# Patient Record
Sex: MALE | Race: WHITE | NOT HISPANIC OR LATINO | Employment: OTHER | URBAN - METROPOLITAN AREA
[De-identification: names, ages, dates, MRNs, and addresses within clinical notes are randomized per-mention and may not be internally consistent; named-entity substitution may affect disease eponyms.]

---

## 2021-09-06 ENCOUNTER — TELEPHONE (OUTPATIENT)
Dept: OTHER | Facility: OTHER | Age: 81
End: 2021-09-06

## 2021-09-06 NOTE — TELEPHONE ENCOUNTER
Patient's daughter is having an issue scheduling a shuntogram  She would like to touch base with the office to discuss this issue, as this is a requirement for the patient's appointment on 9/14  Please give her a call

## 2021-09-07 ENCOUNTER — TELEPHONE (OUTPATIENT)
Dept: NEUROSURGERY | Facility: CLINIC | Age: 81
End: 2021-09-07

## 2021-09-07 NOTE — TELEPHONE ENCOUNTER
Received a call from Tracey King with questions about shuntogram  She states that she is seeking a second opinion from [address] and wonders if a shuntogram can be ordered  She states Maxwell's current nsx will not order this imaging without another appt, but she does not see a valid reason since he was seen so recently by them  Explained that without seeing him we have no way of ordering imaging and justifying it to insurance  She stated an understanding, explained though this imaging would be helpful is not needed by our office to establish and if this study is needed after seeing Dr Al Boswell can have this ordered at that time but that is the decision of the neurosurgeon  She was appreciative

## 2021-09-14 ENCOUNTER — OFFICE VISIT (OUTPATIENT)
Dept: NEUROSURGERY | Facility: CLINIC | Age: 81
End: 2021-09-14
Payer: MEDICARE

## 2021-09-14 ENCOUNTER — HOSPITAL ENCOUNTER (OUTPATIENT)
Dept: RADIOLOGY | Facility: HOSPITAL | Age: 81
Discharge: HOME | End: 2021-09-14
Attending: NEUROLOGICAL SURGERY
Payer: MEDICARE

## 2021-09-14 ENCOUNTER — TRANSCRIBE ORDERS (OUTPATIENT)
Dept: NEUROSURGERY | Facility: CLINIC | Age: 81
End: 2021-09-14

## 2021-09-14 VITALS
RESPIRATION RATE: 18 BRPM | HEIGHT: 72 IN | DIASTOLIC BLOOD PRESSURE: 56 MMHG | TEMPERATURE: 97.4 F | HEART RATE: 64 BPM | SYSTOLIC BLOOD PRESSURE: 114 MMHG

## 2021-09-14 DIAGNOSIS — Z98.2 S/P VP SHUNT: ICD-10-CM

## 2021-09-14 DIAGNOSIS — G91.9 HYDROCEPHALUS, UNSPECIFIED TYPE (HCC): Primary | ICD-10-CM

## 2021-09-14 DIAGNOSIS — G91.9 HYDROCEPHALUS (HCC): ICD-10-CM

## 2021-09-14 PROCEDURE — 99204 OFFICE O/P NEW MOD 45 MIN: CPT | Performed by: NEUROLOGICAL SURGERY

## 2021-09-14 PROCEDURE — 70250 X-RAY EXAM OF SKULL: CPT

## 2021-09-14 PROCEDURE — 74018 RADEX ABDOMEN 1 VIEW: CPT

## 2021-09-14 PROCEDURE — 71046 X-RAY EXAM CHEST 2 VIEWS: CPT

## 2021-09-14 RX ORDER — TAMSULOSIN HYDROCHLORIDE 0.4 MG/1
0.4 CAPSULE ORAL DAILY
COMMUNITY
Start: 2021-09-12

## 2021-09-14 RX ORDER — OMEPRAZOLE 40 MG/1
40 CAPSULE, DELAYED RELEASE ORAL DAILY
COMMUNITY

## 2021-09-14 RX ORDER — PAROXETINE 10 MG/1
10 TABLET, FILM COATED ORAL DAILY
COMMUNITY
Start: 2021-08-30

## 2021-09-14 RX ORDER — RIVAROXABAN 20 MG/1
20 TABLET, FILM COATED ORAL DAILY
COMMUNITY
Start: 2021-09-04

## 2021-09-14 RX ORDER — SIMVASTATIN 10 MG
10 TABLET ORAL
COMMUNITY
Start: 2021-08-27

## 2021-09-14 NOTE — PROGRESS NOTES
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 (previously on Eliquis for 2 years and s/p IVCF), and HCP s/p left frontal VPS placement at [facility] initially in 2004 s/p multiple revisions including for infection/sepsis, last revision in 2007, s/p multiple valve adjustments, last reported to be setting at 1 0 per daughter  For the past year, his confusion and forgetfulness have progressively worsened (stopped by police driving confused, went to ED in February 2021)  Today he has no complaints, but his daughter reports worsening confusion as well as several months of worsening gait imbalance (was able to walk to her house one mile away before May 2021) and intermittent urinary incontinence  He denies headache, neck stiffness, change in vision, numbness, weakness, nausea/vomiting, abdominal pain, seizure, trauma  Last CTH on 5/19/21 showed unchanged diffuse ventriculomegaly compared to prior scans on 5/10/21 and 2/15/21  XR on 5/19/21 showed only chest, distal catheter in this region appears intact  Given that hes taking Xarelto, performing a shunt tap in the office today is suboptimal and risky (patient and daughter understand)  I will order an updated [facility] wo contrast and comprehensive shunt series (including XR skull and abdomen)  I will also schedule a shuntogram at the [address] [place] [address],[address] to assess functionality of shunt  Then I will see the patient back in clinic after shuntogram to discuss next steps        CHIEF COMPLAINT  Forgetfulness     HISTORY    History of Present Illness     [de-identified]y o  year old male HPI    See Discussion    REVIEW OF SYSTEMS    Review of Systems   Constitutional: Negative  HENT: Negative  Eyes: Negative  Respiratory: Negative  Cardiovascular: Negative  H/o HTN/ Hyperlipidemia   Gastrointestinal: Negative  GERD-- On Omeprazole   Endocrine: Negative  Genitourinary: Negative  Musculoskeletal: Negative  Skin: Negative  Allergic/Immunologic: Negative  Neurological: Negative  Hematological: Bruises/bleeds easily  On Xarelto   Psychiatric/Behavioral: Negative  All other systems reviewed and are negative  Meds/Allergies     No current outpatient medications on file  No current facility-administered medications for this visit  Not on File    PAST HISTORY    No past medical history on file  No past surgical history on file  Social History     Tobacco Use    Smoking status: Not on file   Substance Use Topics    Alcohol use: Not on file    Drug use: Not on file       No family history on file  The following portions of the patient's history were reviewed in this encounter and updated as appropriate: Past medical, surgical, family, and social history, as well as medications, allergies, and review of systems  EXAM    Vitals:Blood pressure 114/56, pulse 64, temperature (!) 97 4 °F (36 3 °C), temperature source Tympanic, resp  rate 18, height 6' (1 829 m)  ,There is no height or weight on file to calculate BMI  Physical Exam  Vitals and nursing note reviewed  Constitutional:       Appearance: Normal appearance  He is normal weight  HENT:      Head: Normocephalic and atraumatic  Eyes:      Extraocular Movements: Extraocular movements intact and EOM normal       Pupils: Pupils are equal, round, and reactive to light  Cardiovascular:      Rate and Rhythm: Normal rate and regular rhythm  Pulses: Normal pulses  Heart sounds: Normal heart sounds     Pulmonary:      Effort: Pulmonary effort is normal  Breath sounds: Normal breath sounds  Abdominal:      General: Abdomen is flat  Palpations: Abdomen is soft  Musculoskeletal:         General: Normal range of motion  Cervical back: Normal range of motion  Neurological:      General: No focal deficit present  Mental Status: He is alert  Mental status is at baseline  GCS: GCS eye subscore is 4  GCS verbal subscore is 5  GCS motor subscore is 6  Cranial Nerves: Cranial nerves are intact  Sensory: Sensation is intact  Motor: Motor function is intact  Coordination: Coordination is intact  Deep Tendon Reflexes: Strength normal and reflexes are normal and symmetric  Psychiatric:         Mood and Affect: Mood normal          Speech: Speech normal          Behavior: Behavior normal          Neurologic Exam     Mental Status   Speech: speech is normal   Level of consciousness: alert  Confused at baseline, not know the year but knows daughter's name and prior occupations      Cranial Nerves     CN II   Visual fields full to confrontation  Visual acuity: normal  Right visual field deficit: none  Left visual field deficit: none     CN III, IV, VI   Pupils are equal, round, and reactive to light  Extraocular motions are normal    CN III: no CN III palsy  CN VI: no CN VI palsy  Nystagmus: none   Diplopia: none  Ophthalmoparesis: none  Upgaze: normal  Downgaze: normal  Conjugate gaze: present    CN V   Facial sensation intact  Right facial sensation deficit: none  Left facial sensation deficit: none    CN VII   Facial expression full, symmetric     Right facial weakness: none  Left facial weakness: none    CN VIII   CN VIII normal      CN IX, X   CN IX normal    CN X normal    Palate: symmetric    CN XI   CN XI normal    Right sternocleidomastoid strength: normal  Left sternocleidomastoid strength: normal  Right trapezius strength: normal  Left trapezius strength: normal    CN XII   CN XII normal    Tongue deviation: none    Motor Exam   Muscle bulk: normal  Overall muscle tone: normal  Right arm pronator drift: absent  Left arm pronator drift: absent    Strength   Strength 5/5 throughout  Sensory Exam   Light touch normal      Gait, Coordination, and Reflexes     Gait  Gait: shuffling    Reflexes   Reflexes 2+ except as noted  Gait imbalance, requiring assistance         MEDICAL DECISION MAKING    Imaging Studies:     Last CTH on 5/19/21 showed unchanged diffuse ventriculomegaly compared to prior scans on 5/10/21 and 2/15/21  XR on 5/19/21 showed only chest, distal catheter in this region appears intact    I have personally reviewed pertinent films in [facility]    Neurosurgeon

## 2021-10-05 ENCOUNTER — TELEPHONE (OUTPATIENT)
Dept: NEUROSURGERY | Facility: CLINIC | Age: 81
End: 2021-10-05

## 2021-10-27 ENCOUNTER — HOSPITAL ENCOUNTER (OUTPATIENT)
Dept: RADIOLOGY | Facility: HOSPITAL | Age: 81
Discharge: HOME | End: 2021-10-27
Attending: NEUROLOGICAL SURGERY
Payer: MEDICARE

## 2021-10-27 DIAGNOSIS — G91.9 HYDROCEPHALUS, UNSPECIFIED TYPE (HCC): ICD-10-CM

## 2021-10-27 DIAGNOSIS — Z98.2 S/P VP SHUNT: ICD-10-CM

## 2021-10-27 LAB
APPEARANCE CSF: NORMAL
GLUCOSE CSF-MCNC: 57 MG/DL (ref 50–80)
GRAM STN SPEC: NORMAL
PROT CSF-MCNC: 52 MG/DL (ref 15–45)
RBC # CSF MANUAL: 0 UL (ref 0–10)
TOTAL CELLS COUNTED BLD: NO
TUBE # CSF: 3
WBC # CSF AUTO: 0 /UL (ref 0–5)

## 2021-10-27 PROCEDURE — A9539 TC99M PENTETATE: HCPCS

## 2021-10-27 PROCEDURE — 82945 GLUCOSE OTHER FLUID: CPT | Performed by: NEUROLOGICAL SURGERY

## 2021-10-27 PROCEDURE — 78630 CEREBROSPINAL FLUID SCAN: CPT

## 2021-10-27 PROCEDURE — 89050 BODY FLUID CELL COUNT: CPT | Performed by: NEUROLOGICAL SURGERY

## 2021-10-27 PROCEDURE — G1004 CDSM NDSC: HCPCS

## 2021-10-27 PROCEDURE — 84157 ASSAY OF PROTEIN OTHER: CPT | Performed by: NEUROLOGICAL SURGERY

## 2021-10-27 PROCEDURE — 89051 BODY FLUID CELL COUNT: CPT | Performed by: NEUROLOGICAL SURGERY

## 2021-10-27 PROCEDURE — 87070 CULTURE OTHR SPECIMN AEROBIC: CPT | Performed by: NEUROLOGICAL SURGERY

## 2021-10-27 RX ORDER — SODIUM CHLORIDE 9 MG/ML
INJECTION INTRAVENOUS
Status: DISPENSED
Start: 2021-10-27 | End: 2021-10-27

## 2021-10-30 LAB — BACTERIA CSF CULT: NO GROWTH

## 2021-11-05 ENCOUNTER — OFFICE VISIT (OUTPATIENT)
Dept: NEUROSURGERY | Facility: CLINIC | Age: 81
End: 2021-11-05
Payer: MEDICARE

## 2021-11-05 VITALS
HEIGHT: 72 IN | HEART RATE: 68 BPM | TEMPERATURE: 97.6 F | DIASTOLIC BLOOD PRESSURE: 72 MMHG | RESPIRATION RATE: 16 BRPM | WEIGHT: 203 LBS | SYSTOLIC BLOOD PRESSURE: 124 MMHG | BODY MASS INDEX: 27.5 KG/M2

## 2021-11-05 DIAGNOSIS — R26.89 BALANCE PROBLEM: ICD-10-CM

## 2021-11-05 DIAGNOSIS — Z87.898 HISTORY OF CONFUSION: Primary | ICD-10-CM

## 2021-11-05 PROCEDURE — 99213 OFFICE O/P EST LOW 20 MIN: CPT | Performed by: NEUROLOGICAL SURGERY

## 2021-11-05 RX ORDER — MAG HYDROX/ALUMINUM HYD/SIMETH 400-400-40
30 SUSPENSION, ORAL (FINAL DOSE FORM) ORAL DAILY PRN
COMMUNITY

## 2021-11-05 RX ORDER — DIAPER,BRIEF,INFANT-TODD,DISP
EACH MISCELLANEOUS
COMMUNITY

## 2021-11-05 RX ORDER — SODIUM PHOSPHATE, DIBASIC AND SODIUM PHOSPHATE, MONOBASIC 7; 19 G/133ML; G/133ML
1 ENEMA RECTAL
COMMUNITY

## 2021-11-05 RX ORDER — ACETAMINOPHEN 325 MG/1
325 TABLET ORAL EVERY 4 HOURS PRN
COMMUNITY

## 2021-11-05 RX ORDER — ATENOLOL 25 MG/1
25 TABLET ORAL DAILY
COMMUNITY

## 2021-11-05 RX ORDER — SIMVASTATIN 40 MG
10 TABLET ORAL DAILY
COMMUNITY

## 2021-11-05 RX ORDER — UREA 10 %
400 LOTION (ML) TOPICAL
COMMUNITY

## 2021-11-11 NOTE — PROGRESS NOTES
Pt has CT outside images uploaded in the chart  As per Dr Mcmillan Pulling office note she recommended referrals to Neurology for further follow-up and Physical Therapy  No follow up was needed at this time